# Patient Record
Sex: MALE | Race: WHITE | Employment: FULL TIME
[De-identification: names, ages, dates, MRNs, and addresses within clinical notes are randomized per-mention and may not be internally consistent; named-entity substitution may affect disease eponyms.]

---

## 2021-01-30 ENCOUNTER — NURSE TRIAGE (OUTPATIENT)
Dept: OTHER | Facility: CLINIC | Age: 36
End: 2021-01-30

## 2021-01-30 NOTE — TELEPHONE ENCOUNTER
Reason for Disposition   Patient sounds very sick or weak to the triager    Answer Assessment - Initial Assessment Questions  1. LOCATION: \"Which tooth is hurting? \"  (e.g., right-side/left-side, upper/lower, front/back)      Back molar on left side  2. ONSET: \"When did the toothache start? \"  (e.g., hours, days)       Onset was 01/29/2021  3. SEVERITY: \"How bad is the toothache? \"  (Scale 1-10; mild, moderate or severe)    - MILD (1-3): doesn't interfere with chewing     - MODERATE (4-7): interferes with chewing, interferes with normal activities, awakens from sleep      - SEVERE (8-10): unable to eat, unable to do any normal activities, excruciating pain         Current pain level 8/10; pain at its worst  4. SWELLING: \"Is there any visible swelling of your face? \"      No visible swelling  5. OTHER SYMPTOMS: \"Do you have any other symptoms? \" (e.g., fever)      No fever  6. PREGNANCY: \"Is there any chance you are pregnant? \" \"When was your last menstrual period? \"      no    Protocols used: TOOTHACHE-ADULT-[place]    Patient reports severe tooth pain in his back left molar. Pain levels are 8-10/10 with Ibuprofen on board. Patient denies fever or visible swelling. Recommended patient be seen today in the Urgent Care due to pain levels (pt has already tried to see a dentist this morning and was turned away). Provided care advice.